# Patient Record
Sex: FEMALE | Race: WHITE | NOT HISPANIC OR LATINO | Employment: FULL TIME | URBAN - METROPOLITAN AREA
[De-identification: names, ages, dates, MRNs, and addresses within clinical notes are randomized per-mention and may not be internally consistent; named-entity substitution may affect disease eponyms.]

---

## 2018-03-22 ENCOUNTER — OFFICE VISIT (OUTPATIENT)
Dept: URGENT CARE | Facility: PHYSICIAN GROUP | Age: 23
End: 2018-03-22
Payer: COMMERCIAL

## 2018-03-22 VITALS
OXYGEN SATURATION: 97 % | TEMPERATURE: 99.4 F | DIASTOLIC BLOOD PRESSURE: 74 MMHG | HEART RATE: 106 BPM | SYSTOLIC BLOOD PRESSURE: 102 MMHG | RESPIRATION RATE: 16 BRPM

## 2018-03-22 DIAGNOSIS — J11.1 INFLUENZA: Primary | ICD-10-CM

## 2018-03-22 DIAGNOSIS — R50.9 FEVER, UNSPECIFIED FEVER CAUSE: ICD-10-CM

## 2018-03-22 PROCEDURE — 99203 OFFICE O/P NEW LOW 30 MIN: CPT | Performed by: PHYSICIAN ASSISTANT

## 2018-03-22 RX ORDER — OSELTAMIVIR PHOSPHATE 75 MG/1
75 CAPSULE ORAL 2 TIMES DAILY
Qty: 10 CAP | Refills: 0 | Status: SHIPPED | OUTPATIENT
Start: 2018-03-22 | End: 2018-03-29

## 2018-03-22 NOTE — LETTER
March 22, 2018         Patient: Jan Barrios   YOB: 1995   Date of Visit: 3/22/2018           To Whom it May Concern:    Jan Barrios was seen in my clinic on 3/22/2018. She has been diagnosed with Influenza.  She may return to work on 03/26/2018 or sooner if condition improves sooner.       If you have any questions or concerns, please don't hesitate to call.        Sincerely,           Nisha Worrell P.A.-C.  Electronically Signed

## 2018-03-29 ENCOUNTER — OFFICE VISIT (OUTPATIENT)
Dept: URGENT CARE | Facility: PHYSICIAN GROUP | Age: 23
End: 2018-03-29
Payer: COMMERCIAL

## 2018-03-29 ENCOUNTER — HOSPITAL ENCOUNTER (OUTPATIENT)
Facility: MEDICAL CENTER | Age: 23
End: 2018-03-29
Attending: PHYSICIAN ASSISTANT
Payer: COMMERCIAL

## 2018-03-29 VITALS
DIASTOLIC BLOOD PRESSURE: 70 MMHG | HEART RATE: 66 BPM | WEIGHT: 140 LBS | SYSTOLIC BLOOD PRESSURE: 100 MMHG | BODY MASS INDEX: 21.97 KG/M2 | HEIGHT: 67 IN | TEMPERATURE: 98 F | OXYGEN SATURATION: 97 % | RESPIRATION RATE: 14 BRPM

## 2018-03-29 DIAGNOSIS — N30.01 ACUTE CYSTITIS WITH HEMATURIA: ICD-10-CM

## 2018-03-29 LAB
APPEARANCE UR: NORMAL
BILIRUB UR STRIP-MCNC: NORMAL MG/DL
COLOR UR AUTO: NORMAL
GLUCOSE UR STRIP.AUTO-MCNC: NORMAL MG/DL
INT CON NEG: NEGATIVE
INT CON POS: POSITIVE
KETONES UR STRIP.AUTO-MCNC: NORMAL MG/DL
LEUKOCYTE ESTERASE UR QL STRIP.AUTO: NORMAL
NITRITE UR QL STRIP.AUTO: NORMAL
PH UR STRIP.AUTO: 7 [PH] (ref 5–8)
POC URINE PREGNANCY TEST: NORMAL
PROT UR QL STRIP: NORMAL MG/DL
RBC UR QL AUTO: NORMAL
SP GR UR STRIP.AUTO: 1.02
UROBILINOGEN UR STRIP-MCNC: NORMAL MG/DL

## 2018-03-29 PROCEDURE — 87186 SC STD MICRODIL/AGAR DIL: CPT

## 2018-03-29 PROCEDURE — 99214 OFFICE O/P EST MOD 30 MIN: CPT | Performed by: PHYSICIAN ASSISTANT

## 2018-03-29 PROCEDURE — 81025 URINE PREGNANCY TEST: CPT | Performed by: PHYSICIAN ASSISTANT

## 2018-03-29 PROCEDURE — 87086 URINE CULTURE/COLONY COUNT: CPT

## 2018-03-29 PROCEDURE — 81002 URINALYSIS NONAUTO W/O SCOPE: CPT | Performed by: PHYSICIAN ASSISTANT

## 2018-03-29 PROCEDURE — 87077 CULTURE AEROBIC IDENTIFY: CPT

## 2018-03-29 RX ORDER — NITROFURANTOIN 25; 75 MG/1; MG/1
100 CAPSULE ORAL EVERY 12 HOURS
Qty: 10 CAP | Refills: 0 | Status: SHIPPED | OUTPATIENT
Start: 2018-03-29 | End: 2018-04-03

## 2018-03-29 ASSESSMENT — ENCOUNTER SYMPTOMS
FEVER: 1
CHILLS: 1
FATIGUE: 1
SINUS PAIN: 1
SWOLLEN GLANDS: 0
ARTHRALGIAS: 1
COUGH: 1
HEADACHES: 1
SPUTUM PRODUCTION: 1
SHORTNESS OF BREATH: 0
SORE THROAT: 1
WHEEZING: 0
MYALGIAS: 1

## 2018-03-29 NOTE — PROGRESS NOTES
Chief Complaint   Patient presents with   • UTI     poss uti x2-3 days, frequency, burning       HISTORY OF PRESENT ILLNESS: Patient is a 22 y.o. female who presents today for about 2-3 days of suspected UTI.  She has had burning with urination, urgency and dysuria.   Patient notes that has been light pink tinge with wiping at times as well and suspected blood in the urine at this point.   She denies vaginal discharge and states no concern at this time for STD.   She denies nausea, vomiting, flank pain, fevers or chills.  No attempted interventions OTC.     There are no active problems to display for this patient.      Allergies:Amoxicillin    Current Outpatient Prescriptions Ordered in [facility]   Medication Sig Dispense Refill   • nitrofurantoin monohydr macro (MACROBID) 100 MG Cap Take 1 Cap by mouth every 12 hours for 5 days. 10 Cap 0   • SPRINTEC 28 0.25-35 MG-MCG per tablet      • NON SPECIFIED bcp     • methylphenidate (RITALIN) 5 MG TABS Take 5 mg by mouth 2 times a day.     • Non Formulary Request Plan b pill     • ondansetron (ZOFRAN) 4 MG TABS Take 1 Tab by mouth every 8 hours as needed for Nausea/Vomiting. 15 Each 0     No current [facility]-ordered facility-administered medications on file.        No past medical history on file.    Social History   Substance Use Topics   • Smoking status: Never Smoker   • Smokeless tobacco: Never Used   • Alcohol use Not on file       No family status information on file.   No family history on file.    ROS:  Review of Systems   Constitutional: Negative for fever, chills, weight loss and malaise/fatigue.   HENT: Negative for ear pain, nosebleeds, congestion, sore throat and neck pain.    Eyes: Negative for blurred vision.   Respiratory: Negative for cough, sputum production, shortness of breath and wheezing.    Cardiovascular: Negative for chest pain, palpitations, orthopnea and leg swelling.   Gastrointestinal: Negative for heartburn, nausea, vomiting and abdominal pain.  "  Genitourinary: SEE HPI    Exam:  Blood pressure 100/70, pulse 66, temperature 36.7 °C (98 °F), resp. rate 14, height 1.689 m (5' 6.5\"), weight 63.5 kg (140 lb), last menstrual period 03/15/2018, SpO2 97 %.  General:  Well nourished, well developed female in NAD  Eyes: PERRLA, EOM within normal limits, no conjunctival injection, no scleral icterus, visual fields and acuity grossly intact.   Mouth: reasonable hygiene, no erythema exudates or tonsillar enlargement.  Neck: no masses, range of motion within normal limits, no tracheal deviation. No lymphadenopathy  Pulmonary: Normal respiratory effort, no wheezes, crackles, or rhonchi.  Cardiovascular: regular rate and rhythm without murmurs, rubs, or gallops.  Abdomen: Soft, nontender, nondistended. Normal bowel sounds. No hepatosplenomegaly or masses, or hernias. No rebound or guarding.  No CVA tenderness.   Skin: No visible rashes or lesion. Warm, pink, dry.   Extremities: no clubbing, cyanosis, or edema.  Neuro: A&O x 3. Speech normal/clear.  Normal gait.       Component Results     Component Value Ref Range & Units Status   POC Color orange  Negative Final   POC Appearance cloudy  Negative Final   POC Leukocyte Esterase lg  Negative Final   POC Nitrites pink  Negative Final   POC Urobiligen neg  Negative (0.2) mg/dL Final   POC Protein neg  Negative mg/dL Final   POC Urine PH 7.0  5.0 - 8.0 Final   POC Blood mod  Negative Final   POC Specific Gravity 1.020  <1.005 - >1.030 Final   POC Ketones neg  Negative mg/dL Final   POC Bilirubin neg  Negative mg/dL Final   POC Glucose neg  Negative mg/dL Final       Assessment/Plan:  1. Acute cystitis with hematuria  POCT Urinalysis    POCT PREGNANCY    nitrofurantoin monohydr macro (MACROBID) 100 MG Cap    URINE CULTURE(NEW)       -POCT U/A as above.  Culture sent  -Fluids emphasized.  Void before/after intercourse.  Recommend abstain until treatment complete/symptoms resolved.   -signs and symptoms of worsening/ascending " infection discussed and to seek prompt medical care should they arise.       Supportive care, differential diagnoses, and indications for immediate follow-up discussed with patient.   Pathogenesis of diagnosis discussed including typical length and natural progression.   Instructed to return to clinic or nearest emergency department for any change in condition, further concerns, or worsening of symptoms.  Patient states understanding of the plan of care and discharge instructions.        Rosalia Christopher P.A.-C.

## 2018-03-30 NOTE — PROGRESS NOTES
Subjective:      Jan Barrios is a 22 y.o. female who presents with Sinus Problem (sore throat, cough, fever x 3 days )    Pt PMH, SocHx, SurgHx, FamHx, Drug allergies and medications reviewed with pt/EPIC.      Family history reviewed, it is not pertinent to this complaint.           Influenza   This is a new problem. The current episode started in the past 7 days. The problem occurs constantly. The problem has been rapidly worsening. Associated symptoms include arthralgias, chills, congestion, coughing, fatigue, a fever, headaches, myalgias and a sore throat. Pertinent negatives include no rash or swollen glands. The symptoms are aggravated by coughing and exertion. She has tried NSAIDs, drinking and acetaminophen for the symptoms. The treatment provided no relief.       Review of Systems   Constitutional: Positive for chills, fatigue, fever and malaise/fatigue.   HENT: Positive for congestion, sinus pain and sore throat.    Respiratory: Positive for cough and sputum production. Negative for shortness of breath and wheezing.    Musculoskeletal: Positive for arthralgias and myalgias.   Skin: Negative for rash.   Neurological: Positive for headaches.   All other systems reviewed and are negative.         Objective:     /74   Pulse (!) 106   Temp 37.4 °C (99.4 °F)   Resp 16   LMP 03/15/2018   SpO2 97%      Physical Exam   Constitutional: She is oriented to person, place, and time. She appears well-developed and well-nourished. No distress.   HENT:   Head: Normocephalic and atraumatic.   Right Ear: Tympanic membrane normal.   Left Ear: Tympanic membrane normal.   Nose: Rhinorrhea present.   Mouth/Throat: Uvula is midline and mucous membranes are normal. Posterior oropharyngeal erythema present.   Eyes: Conjunctivae and EOM are normal. Pupils are equal, round, and reactive to light.   Neck: Normal range of motion. Neck supple.   Cardiovascular: Normal rate, regular rhythm and normal heart sounds.     Pulmonary/Chest: Effort normal. No respiratory distress. She has no wheezes. She has rhonchi. She has no rales.   Abdominal: Soft.   Musculoskeletal: Normal range of motion.   Neurological: She is alert and oriented to person, place, and time.   Skin: Skin is warm and dry. Capillary refill takes less than 2 seconds.   Psychiatric: She has a normal mood and affect.   Nursing note and vitals reviewed.       Assessment/Plan:     1. Influenza     2. Fever, unspecified fever cause       Rx for tamiflu given to patient to fill if she would like to take it.     PT can continue OTC medications, increase fluids and rest until symptoms improve.     PT should follow up with PCP in 1-2 days for re-evaluation if symptoms have not improved.  Discussed red flags and reasons to return to UC or ED.  Pt and/or family verbalized understanding of diagnosis and follow up instructions and was offered informational handout on diagnosis.  PT discharged.

## 2018-04-01 LAB
BACTERIA UR CULT: ABNORMAL
BACTERIA UR CULT: ABNORMAL
SIGNIFICANT IND 70042: ABNORMAL
SITE SITE: ABNORMAL
SOURCE SOURCE: ABNORMAL

## 2019-02-04 ENCOUNTER — HOSPITAL ENCOUNTER (EMERGENCY)
Facility: MEDICAL CENTER | Age: 24
End: 2019-02-04
Attending: EMERGENCY MEDICINE
Payer: COMMERCIAL

## 2019-02-04 ENCOUNTER — NON-PROVIDER VISIT (OUTPATIENT)
Dept: OCCUPATIONAL MEDICINE | Facility: CLINIC | Age: 24
End: 2019-02-04
Payer: COMMERCIAL

## 2019-02-04 ENCOUNTER — APPOINTMENT (OUTPATIENT)
Dept: RADIOLOGY | Facility: MEDICAL CENTER | Age: 24
End: 2019-02-04
Attending: EMERGENCY MEDICINE
Payer: COMMERCIAL

## 2019-02-04 VITALS
HEIGHT: 67 IN | SYSTOLIC BLOOD PRESSURE: 131 MMHG | BODY MASS INDEX: 21.97 KG/M2 | HEART RATE: 84 BPM | TEMPERATURE: 97.1 F | OXYGEN SATURATION: 98 % | DIASTOLIC BLOOD PRESSURE: 88 MMHG | RESPIRATION RATE: 16 BRPM | WEIGHT: 140 LBS

## 2019-02-04 DIAGNOSIS — Z02.83 ENCOUNTER FOR DRUG SCREENING: ICD-10-CM

## 2019-02-04 DIAGNOSIS — Z02.1 PRE-EMPLOYMENT DRUG SCREENING: ICD-10-CM

## 2019-02-04 DIAGNOSIS — S60.221A CONTUSION OF RIGHT HAND, INITIAL ENCOUNTER: ICD-10-CM

## 2019-02-04 PROCEDURE — 73130 X-RAY EXAM OF HAND: CPT | Mod: RT

## 2019-02-04 PROCEDURE — 99284 EMERGENCY DEPT VISIT MOD MDM: CPT

## 2019-02-04 PROCEDURE — 80305 DRUG TEST PRSMV DIR OPT OBS: CPT | Performed by: INTERNAL MEDICINE

## 2019-02-04 PROCEDURE — 99026 IN-HOSPITAL ON CALL SERVICE: CPT | Performed by: INTERNAL MEDICINE

## 2019-02-04 PROCEDURE — 82075 ASSAY OF BREATH ETHANOL: CPT | Performed by: INTERNAL MEDICINE

## 2019-02-04 ASSESSMENT — LIFESTYLE VARIABLES: DO YOU DRINK ALCOHOL: NO

## 2019-02-04 NOTE — LETTER
"  FORM C-4:  EMPLOYEE’S CLAIM FOR COMPENSATION/ REPORT OF INITIAL TREATMENT  EMPLOYEE’S CLAIM - PROVIDE ALL INFORMATION REQUESTED   First Name  Jan Last Name  Denis Birthdate             Age  1995 23 y.o. Sex  female Claim Number   Home Employee Address  [address]                                     Zip  [address] Height  1.702 m (5' 7\") Weight  63.5 kg (140 lb) [facility]     Mailing Employee Address                           [address]               Zip  [address] Telephone  [phone] (home)  Primary Language Spoken  ENGLISH   Insurer   Third Party   AIG Employee's Occupation (Job Title) When Injury or Occupational Disease Occurred  [employer]   Employer's Name  [employer] Telephone  [phone]    Employer Address  [address] Zip  [address]   Date of Injury  2/4/2019       Hour of Injury  5:00 PM Date Employer Notified  2/4/2019 Last Day of Work after Injury or Occupational Disease  2/4/2019 Supervisor to Whom Injury Reported  Andi Campa   Address or Location of Accident (if applicable)  [[address] [place] [address]]   What were you doing at the time of accident? (if applicable)  Moving a patient    How did this injury or occupational disease occur? Be specific and answer in detail. Use additional sheet if necessary)  Moving the cot outside in 4-5 inches of snow on an uneven side walk. Sidewalk is very narrow. While guiding the cot, my hand was smashed between a [address] cot and the edge of the garage   If you believe that you have an occupational disease, when did you first have knowledge of the disability and it relationship to your employment?  N/A Witnesses to the Accident  Ravindra Hsu      Nature of Injury or Occupational Disease  Workers' Compensation  Part(s) of Body Injured or Affected  Hand (R), N/A, N/A    I certify that the above is true and correct to the best of my " knowledge and that I have provided this information in order to obtain the benefits of [place]  I hereby authorize any physician, chiropractor, surgeon, practitioner, or other person, any hospital, including [facility] or [facility], any medical service organization, any insurance company, or other institution or organization to release to each other, any medical or other information, including benefits paid or payable, pertinent to this injury or disease, except information relative to diagnosis, treatment and/or counseling for AIDS, psychological conditions, alcohol or controlled substances, for which I must give specific authorization.  A Photostat of this authorization shall be as valid as the original.   Date  2/4/2019 [facility] Employee’s Signature   THIS REPORT MUST BE COMPLETED AND MAILED WITHIN 3 WORKING DAYS OF TREATMENT   Place  [facility], EMERGENCY DEPT  Name of Facility   [facility]   Date  2/4/2019 Diagnosis  (S60.221A) Contusion of right hand, initial encounter Is there evidence the injured employee was under the influence of alcohol and/or another controlled substance at the time of accident?   Hour  7:51 PM Description of Injury or Disease  Contusion of right hand, initial encounter No   Treatment  Physician evaluation and treatment of right hand contusion  Have you advised the patient to remain off work five days or more?         No   X-Ray Findings  Negative   If Yes   From Date    To Date      From information given by the employee, together with medical evidence, can you directly connect this injury or occupational disease as job incurred?  Yes If No, is the employee capable of: Full Duty  No Modified Duty  Yes   Is additional medical care by a physician indicated?  No If Modified Duty, Specify any  "Limitations / Restrictions  No use of right hand for 2 days until symptoms resolved     Do you know of any previous injury or disease contributing to this condition or occupational disease?  No   Date  2/4/2019 Print Doctor’s Name  Martin Delgado I certify the employer’s copy of this form was mailed on:   Address  [address] [place] [id number] [phone] Insurer’s Use Only   [facility]  [address]    Provider’s Tax ID Number   [id number] Telephone  Dept: [phone]    Doctor’s Signature  e-MARTIN Corona M.D. Degree   MD    Original - TREATING PHYSICIAN OR CHIROPRACTOR   Pg 2-Insurer/TPA   Pg 3-Employer   Pg 4-Employee                                                                                                  Form C-4 (rev01/03)     BRIEF DESCRIPTION OF RIGHTS AND BENEFITS  (Pursuant to [place] 616C.[place])  Notice of Injury or Occupational Disease (Incident Report Form C-1): If an injury or occupational disease (OD) arises out of and in the course of employment, you must provide written notice to your employer as soon as practicable, but no later than 7 days after the accident or OD. Your employer shall maintain a sufficient supply of the required forms.  Claim for Compensation (Form C-4): If medical treatment is sought, the form C-4 is available at the place of initial treatment. A completed \"Claim for Compensation\" (Form C-4) must be filed within 90 days after an accident or OD. The treating physician or chiropractor must, within 3 working days after treatment, complete and mail to the employer, the employer's insurer and third-party , the Claim for Compensation.  Medical Treatment: If you require medical treatment for your on-the-job injury or OD, you may be required to select a physician or chiropractor from a list provided by your workers’ compensation insurer, if it has contracted with an Organization for Managed Care (MCO) or Preferred Provider Organization " (PPO) or providers of health care. If your employer has not entered into a contract with an MCO or PPO, you may select a physician or chiropractor from the Panel of Physicians and Chiropractors. Any medical costs related to your industrial injury or OD will be paid by your insurer.  Temporary Total Disability (TTD): If your doctor has certified that you are unable to work for a period of at least 5 consecutive days, or 5 cumulative days in a 20-day period, or places restrictions on you that your employer does not accommodate, you may be entitled to TTD compensation.  Temporary Partial Disability (TPD): If the wage you receive upon reemployment is less than the compensation for TTD to which you are entitled, the insurer may be required to pay you TPD compensation to make up the difference. TPD can only be paid for a maximum of 24 months.  Permanent Partial Disability (PPD): When your medical condition is stable and there is an indication of a PPD as a result of your injury or OD, within 30 days, your insurer must arrange for an evaluation by a rating physician or chiropractor to determine the degree of your PPD. The amount of your PPD award depends on the date of injury, the results of the PPD evaluation and your age and wage.  Permanent Total Disability (PTD): If you are medically certified by a treating physician or chiropractor as permanently and totally disabled and have been granted a PTD status by your insurer, you are entitled to receive monthly benefits not to exceed 66 2/3% of your average monthly wage. The amount of your PTD payments is subject to reduction if you previously received a PPD award.  Vocational Rehabilitation Services: You may be eligible for vocational rehabilitation services if you are unable to return to the job due to a permanent physical impairment or permanent restrictions as a result of your injury or occupational disease.  Transportation and Per Li Reimbursement: You may be eligible  for travel expenses and per nichole associated with medical treatment.  Reopening: You may be able to reopen your claim if your condition worsens after claim closure.  Appeal Process: If you disagree with a written determination issued by the insurer or the insurer does not respond to your request, you may appeal to the Department of Administration, , by following the instructions contained in your determination letter. You must appeal the determination within 70 days from the date of the determination letter at [address], [address], [place], [place] [address], or [address], [address], [place], [place] [address]. If you disagree with the  decision, you may appeal to the Department of Administration, . You must file your appeal within 30 days from the date of the  decision letter at [address], [address], [place], [place] [address], or [address], [address], [place], [place] [address]. If you disagree with a decision of an , you may file a petition for judicial review with the District Court. You must do so within 30 days of the Appeal Officer’s decision. You may be represented by an  at your own expense or you may contact the [facility] for possible representation.  [place]  for Injured Workers (NAIW): If you disagree with a  decision, you may request that NAIW represent you without charge at an  Hearing. For information regarding denial of benefits, you may contact the [facility] at: [address], [address] [address], [phone], or [address], [address], [place] [address], [phone]  To File a Complaint with the Division: If you wish to file a complaint with the  of the Division of Industrial Relations (DIR), please contact the Workers’ Compensation Section, [address], [address], [place], [place] [address],  telephone [phone], or [address], [address], [place], [place] [address], telephone [phone].  For assistance with Workers’ Compensation Issues: you may contact the Office of the Governor Consumer Health Assistance, [address] [place] [address], [address], [place], [place] [address], Toll Free 1-267.449.4974, Web site: http://[facility].nv., E-mail [email or web address].[email or web address].                                                                                                                                                                               __________________________________________________________________                                    _________________            Employee Name / Signature                                                                                                                            Date                                       D-2 (rev. 10/07)

## 2019-02-05 LAB
AMP AMPHETAMINE: NORMAL
BREATH ALCOHOL COMMENT: NORMAL
COC COCAINE: NORMAL
INT CON NEG: NORMAL
INT CON POS: NORMAL
MET METHAMPHETAMINES: NORMAL
OPI OPIATES: NORMAL
PCP PHENCYCLIDINE: NORMAL
POC BREATHALIZER: 0 PERCENT (ref 0–0.01)
POC DRUG COMMENT 753798-OCCUPATIONAL HEALTH: NEGATIVE
THC: NORMAL

## 2019-02-05 NOTE — ED PROVIDER NOTES
"ED Provider Note    CHIEF COMPLAINT  Chief Complaint   Patient presents with   • Hand Pain     right hand       HPI  Jan Barrios is a 23 y.o. female who presents for evaluation of acute injury to the right hand.  The patient was at work as a paramedic.  She got her right hand crushed between a stretcher and another hard object.  Injury occurred an hour ago.  She experienced significant pain and swelling.  No pain in the wrist no other complaints no soft tissue injury or laceration or abrasion.  Patient is otherwise healthy no other complaints no numbness weakness or tingling    REVIEW OF SYSTEMS  See HPI for further details.  No numbness weakness tingling fevers chills all other systems are negative.     PAST MEDICAL HISTORY  No past medical history on file.  No significant medical history  FAMILY HISTORY  Noncontributory    SOCIAL HISTORY  Social History     Social History   • Marital status: Single     Spouse name: N/A   • Number of children: N/A   • Years of education: N/A     Social History Main Topics   • Smoking status: Never Smoker   • Smokeless tobacco: Never Used   • Alcohol use Not on file   • Drug use: Unknown   • Sexual activity: Not on file     Other Topics Concern   • Not on file     Social History Narrative   • No narrative on file       SURGICAL HISTORY  Past Surgical History:   Procedure Laterality Date   • CHOLECYSTECTOMY         CURRENT MEDICATIONS  Home Medications    **Home medications have not yet been reviewed for this encounter**     None    ALLERGIES  Allergies   Allergen Reactions   • Amoxicillin        PHYSICAL EXAM  VITAL SIGNS: /96   Pulse 87   Temp 35.9 °C (96.7 °F) (Temporal)   Resp 18   Ht 1.702 m (5' 7\")   Wt 63.5 kg (140 lb)   LMP 01/28/2019 (Approximate)   SpO2 98%   Breastfeeding? No   BMI 21.93 kg/m²       Constitutional: Well developed, Well nourished, No acute distress, Non-toxic appearance.   HENT: Normocephalic, Atraumatic, Bilateral external ears normal, " Oropharynx moist, No oral exudates, Nose normal.   Eyes: PERRLA, EOMI, Conjunctiva normal, No discharge.   Neck: Normal range of motion, No tenderness, Supple, No stridor.   Cardiovascular: Normal heart rate, Normal rhythm, No murmurs, No rubs, No gallops.   Thorax & Lungs: Normal breath sounds, No respiratory distress, No wheezing, No chest tenderness.   Abdomen: Bowel sounds normal, Soft, No tenderness, No masses, No pulsatile masses.   Extremities: Soft tissue swelling noted to the dorsal aspect of the right hand overlying the metacarpal heads of the third fourth and fifth digits neurovascular exam is normal flexion and extension is normal neurologic: Alert & oriented x 3, Normal motor function, Normal sensory function, No focal deficits noted.   Psychiatric: Anxious         RADIOLOGY/PROCEDURES  DX-HAND 3+ RIGHT   Final Result      No acute fracture identified. Pain persists, recommend repeat imaging in 7 days.          COURSE & MEDICAL DECISION MAKING  Pertinent Labs & Imaging studies reviewed. (See chart for details)  Patient has no obvious fracture.  There is no suggestion of neurovascular compromise.  I recommended rest ice and NSAIDs.    FINAL IMPRESSION  1.  Right hand contusion    Electronically signed by: Martin Delgado, 2/4/2019 6:52 PM

## 2019-02-05 NOTE — ED TRIAGE NOTES
Pt is a walk in from EMS when she caught her hand between a gurney and a garage door. Pt complains of pain in her right hand and tingling. CMS in tact.

## 2019-02-07 ENCOUNTER — OCCUPATIONAL MEDICINE (OUTPATIENT)
Dept: URGENT CARE | Facility: CLINIC | Age: 24
End: 2019-02-07
Payer: COMMERCIAL

## 2019-02-07 VITALS
HEIGHT: 67 IN | WEIGHT: 140 LBS | DIASTOLIC BLOOD PRESSURE: 82 MMHG | TEMPERATURE: 97.4 F | BODY MASS INDEX: 21.97 KG/M2 | OXYGEN SATURATION: 97 % | HEART RATE: 54 BPM | RESPIRATION RATE: 16 BRPM | SYSTOLIC BLOOD PRESSURE: 124 MMHG

## 2019-02-07 DIAGNOSIS — S67.21XA CRUSHING INJURY OF RIGHT HAND, INITIAL ENCOUNTER: ICD-10-CM

## 2019-02-07 PROCEDURE — 99214 OFFICE O/P EST MOD 30 MIN: CPT | Mod: 29 | Performed by: PHYSICIAN ASSISTANT

## 2019-02-07 NOTE — LETTER
[facility]  [address] [facility] [place] [address]  Phone:  [phone] - Fax:  [phone]   [facility] Progress Report and Disability Certification  Date of Service: 2/7/2019   No Show:  No  Date / Time of Next Visit:     Claim Information   Patient Name: Jan Barrios  Claim Number:     Employer: [employer]  Date of Injury: 2/4/2019     Insurer / TPA: [employer] ID / SSN:     Occupation: [employer]  Diagnosis: The encounter diagnosis was Crushing injury of right hand, initial encounter.    Medical Information   Related to Industrial Injury? Yes    Subjective Complaints:  DOI: 2/7/19. Right hand crush injury. Seen in ER, xray NEG. Much improved today.  No further pain.  Range of motion normal.  Strength normal.  Some localized bruising.  Not taking anything.  Has not been back to work yet, for scheduled shift tomorrow.  Believe she can complete full duty.  No previous injury.  No second job.   Objective Findings: No areas of obvious tenderness.  Some bruising on the dorsal lateral right hand.  Strength normal.  No deformity seen.  Range of motion in all surrounding joints within normal limits.  Exam essentially normal   Pre-Existing Condition(s):     Assessment:   Initial Visit    Status: Discharged /  MMI  Permanent Disability:No    Plan:      Diagnostics:      Comments:       Disability Information   Status: Released to Full Duty    From:     Through:   Restrictions are:     Physical Restrictions   Sitting:    Standing:    Stooping:    Bending:      Squatting:    Walking:    Climbing:    Pushing:      Pulling:    Other:    Reaching Above Shoulder (L):   Reaching Above Shoulder (R):       Reaching Below Shoulder (L):    Reaching Below Shoulder (R):      Not to exceed Weight Limits   Carrying(hrs):   Weight Limit(lb):   Lifting(hrs):   Weight  Limit(lb):     Comments:      Repetitive Actions   Hands: i.e. Fine Manipulations from Grasping:     Feet: i.e. Operating Foot  Controls:     Driving / Operate Machinery:     Physician Name: Leena Romo P.A.-C. Physician Signature: LENEA Meza P.A.-C. e-Signature: Dr. Julio Escobedo, Medical Director   Clinic Name / Location: [address] [address] Clinic Phone Number: Dept: [phone]   Appointment Time: 5:15 Pm Visit Start Time: 5:43 PM   Check-In Time:  5:09 Pm Visit Discharge Time: 6:00 PM   Original-Treating Physician or Chiropractor    Page 2-Insurer/TPA    Page 3-Employer    Page 4-Employee

## 2019-02-08 NOTE — PROGRESS NOTES
"Subjective:      Jan Barrios is a 23 y.o. female who presents with Hand Injury ( follow up for Rt. hand )      DOI: 2/7/19. Right hand crush injury. Seen in ER, xray NEG. Much improved today.  No further pain.  Range of motion normal.  Strength normal.  Some localized bruising.  Not taking anything.  Has not been back to work yet, for scheduled shift tomorrow.  Believe she can complete full duty.  No previous injury.  No second job.     Hand Injury         ROS    Medications, Allergies, and current problem list reviewed today in Epic     Objective:     /82 (BP Location: Left arm, Patient Position: Sitting, BP Cuff Size: Adult)   Pulse (!) 54   Temp 36.3 °C (97.4 °F) (Temporal)   Resp 16   Ht 1.702 m (5' 7\")   Wt 63.5 kg (140 lb)   LMP 01/28/2019 (Approximate)   SpO2 97%   BMI 21.93 kg/m²      Physical Exam   Constitutional: She is oriented to person, place, and time. She appears well-developed and well-nourished. No distress.   Neurological: She is alert and oriented to person, place, and time.   Skin: Skin is warm and dry. She is not diaphoretic.   Psychiatric: She has a normal mood and affect. Her behavior is normal. Judgment and thought content normal.   Nursing note and vitals reviewed.      No areas of obvious tenderness.  Some bruising on the dorsal lateral right hand.  Strength normal.  No deformity seen.  Range of motion in all surrounding joints within normal limits.  Exam essentially normal       Assessment/Plan:     1. Crushing injury of right hand, initial encounter       Exam unremarkable no further tenderness.  Strength normal.    Discharged MMI    Please note that this dictation was created using voice recognition software. I have made every reasonable attempt to correct obvious errors, but I expect that there are errors of grammar and possibly content that I did not discover before finalizing the note.    "

## 2019-03-28 ENCOUNTER — OFFICE VISIT (OUTPATIENT)
Dept: URGENT CARE | Facility: CLINIC | Age: 24
End: 2019-03-28
Payer: COMMERCIAL

## 2019-03-28 VITALS
RESPIRATION RATE: 16 BRPM | HEART RATE: 70 BPM | DIASTOLIC BLOOD PRESSURE: 86 MMHG | TEMPERATURE: 97.8 F | WEIGHT: 165 LBS | OXYGEN SATURATION: 98 % | BODY MASS INDEX: 25.9 KG/M2 | SYSTOLIC BLOOD PRESSURE: 126 MMHG | HEIGHT: 67 IN

## 2019-03-28 DIAGNOSIS — Z02.89 ENCOUNTER FOR PHYSICAL EXAMINATION RELATED TO EMPLOYMENT: ICD-10-CM

## 2019-03-28 PROCEDURE — 7101 PR PHYSICAL: Performed by: NURSE PRACTITIONER

## 2019-03-28 ASSESSMENT — ENCOUNTER SYMPTOMS
DIZZINESS: 0
EYE DISCHARGE: 0
PALPITATIONS: 0
ABDOMINAL PAIN: 0
HEADACHES: 0
EYE PAIN: 0
BACK PAIN: 0
BLURRED VISION: 0
SHORTNESS OF BREATH: 0
PHOTOPHOBIA: 0
SENSORY CHANGE: 0
FOCAL WEAKNESS: 0
NECK PAIN: 0
TINGLING: 0
VOMITING: 0
EYE REDNESS: 0
NAUSEA: 0
DOUBLE VISION: 0
WEAKNESS: 0
COUGH: 0
FEVER: 0
CHILLS: 0

## 2019-03-28 ASSESSMENT — LIFESTYLE VARIABLES: SUBSTANCE_ABUSE: 0

## 2019-03-29 NOTE — PROGRESS NOTES
"Subjective:      Jan Barrios is a 23 y.o. female who presents with Employment Physical (employment physical)    Denies past medical, surgical or family history that is significant to today's problem.   RX or OTC medications-- reviewed with patient today.   Allergies   Allergen Reactions   • Amoxicillin              HPI here today for physical exam for EMS.       Review of Systems   Constitutional: Negative for chills, fever and malaise/fatigue.   HENT: Negative for hearing loss and tinnitus.    Eyes: Negative for blurred vision, double vision, photophobia, pain, discharge and redness.   Respiratory: Negative for cough and shortness of breath.    Cardiovascular: Negative for chest pain, palpitations and leg swelling.   Gastrointestinal: Negative for abdominal pain, nausea and vomiting.   Musculoskeletal: Negative for back pain and neck pain.   Neurological: Negative for dizziness, tingling, sensory change, focal weakness, weakness and headaches.   Psychiatric/Behavioral: Negative for substance abuse.          Objective:     /86 (BP Location: Left arm, Patient Position: Sitting, BP Cuff Size: Adult)   Pulse 70   Temp 36.6 °C (97.8 °F) (Temporal)   Resp 16   Ht 1.702 m (5' 7\")   Wt 74.8 kg (165 lb)   SpO2 98%   BMI 25.84 kg/m²      Physical Exam   Constitutional: She is oriented to person, place, and time. She appears well-developed and well-nourished.   HENT:   Head: Normocephalic and atraumatic.   Right Ear: Hearing, tympanic membrane, external ear and ear canal normal.   Left Ear: Hearing, tympanic membrane, external ear and ear canal normal.   Nose: Nose normal.   Mouth/Throat: Oropharynx is clear and moist. No oropharyngeal exudate.   Whisper test WNL at 5 Ft    Eyes: Pupils are equal, round, and reactive to light. Conjunctivae, EOM and lids are normal.   Visual acuity 20/20 OU uncorrected   Neck: Trachea normal, normal range of motion, full passive range of motion without pain and phonation " normal. Neck supple. No spinous process tenderness present. No erythema and normal range of motion present.   Cardiovascular: Normal rate, regular rhythm, normal heart sounds and intact distal pulses.    No murmur heard.  Pulmonary/Chest: Effort normal and breath sounds normal. No respiratory distress. She has no wheezes. She has no rales. She exhibits no tenderness.   Abdominal: Soft. Normal appearance and bowel sounds are normal. She exhibits no distension and no mass. There is no hepatosplenomegaly. There is no tenderness. There is no rebound, no guarding and no CVA tenderness. No hernia.   Musculoskeletal: Normal range of motion.   Neg SLR bilaterally   Lymphadenopathy:     She has no cervical adenopathy.   Neurological: She is alert and oriented to person, place, and time. She has normal strength. She displays no atrophy and no tremor. No cranial nerve deficit or sensory deficit. She exhibits normal muscle tone. Coordination and gait normal. GCS eye subscore is 4. GCS verbal subscore is 5. GCS motor subscore is 6.   Reflex Scores:       Tricep reflexes are 2+ on the right side and 2+ on the left side.       Bicep reflexes are 2+ on the right side and 2+ on the left side.       Brachioradialis reflexes are 2+ on the right side and 2+ on the left side.       Patellar reflexes are 2+ on the right side and 2+ on the left side.  Skin: Skin is warm and dry.   Psychiatric: She has a normal mood and affect. Her speech is normal and behavior is normal. Judgment and thought content normal. Cognition and memory are normal.   Nursing note and vitals reviewed.              Assessment/Plan:       1. Encounter for physical examination related to employment  Visual Acuity Screening     No restrictions.   See medical clearance attached.

## 2025-02-23 ENCOUNTER — RESULTS FOLLOW-UP (OUTPATIENT)
Dept: URGENT CARE | Facility: PHYSICIAN GROUP | Age: 30
End: 2025-02-23

## 2025-02-23 ENCOUNTER — HOSPITAL ENCOUNTER (OUTPATIENT)
Facility: MEDICAL CENTER | Age: 30
End: 2025-02-23
Attending: PHYSICIAN ASSISTANT
Payer: OTHER GOVERNMENT

## 2025-02-23 ENCOUNTER — OFFICE VISIT (OUTPATIENT)
Dept: URGENT CARE | Facility: PHYSICIAN GROUP | Age: 30
End: 2025-02-23
Payer: OTHER GOVERNMENT

## 2025-02-23 VITALS
HEIGHT: 67 IN | TEMPERATURE: 97.3 F | OXYGEN SATURATION: 100 % | HEART RATE: 98 BPM | BODY MASS INDEX: 29.03 KG/M2 | DIASTOLIC BLOOD PRESSURE: 72 MMHG | SYSTOLIC BLOOD PRESSURE: 124 MMHG | RESPIRATION RATE: 18 BRPM | WEIGHT: 185 LBS

## 2025-02-23 DIAGNOSIS — R50.9 FEVER, UNSPECIFIED FEVER CAUSE: ICD-10-CM

## 2025-02-23 DIAGNOSIS — J02.9 SORE THROAT: Primary | ICD-10-CM

## 2025-02-23 LAB — S PYO DNA SPEC NAA+PROBE: NOT DETECTED

## 2025-02-23 PROCEDURE — 87651 STREP A DNA AMP PROBE: CPT | Performed by: PHYSICIAN ASSISTANT

## 2025-02-23 PROCEDURE — 3074F SYST BP LT 130 MM HG: CPT | Performed by: PHYSICIAN ASSISTANT

## 2025-02-23 PROCEDURE — 99203 OFFICE O/P NEW LOW 30 MIN: CPT | Performed by: PHYSICIAN ASSISTANT

## 2025-02-23 PROCEDURE — 87070 CULTURE OTHR SPECIMN AEROBIC: CPT

## 2025-02-23 PROCEDURE — 3078F DIAST BP <80 MM HG: CPT | Performed by: PHYSICIAN ASSISTANT

## 2025-02-23 RX ORDER — PREDNISONE 20 MG/1
20 TABLET ORAL 2 TIMES DAILY
Qty: 6 TABLET | Refills: 0 | Status: SHIPPED | OUTPATIENT
Start: 2025-02-23 | End: 2025-02-26

## 2025-02-24 DIAGNOSIS — R50.9 FEVER, UNSPECIFIED FEVER CAUSE: ICD-10-CM

## 2025-02-24 DIAGNOSIS — J02.9 SORE THROAT: ICD-10-CM

## 2025-02-24 NOTE — PROGRESS NOTES
"Hakeem Barrios is a 29 y.o. female who presents with Sore Throat (X 2 day. 29 weeks pregnant)    PMH:  has no past medical history of Asthma or Diabetes ([facility]).  MEDS:   Current Outpatient Medications:     SPRINTEC 28 0.25-35 MG-MCG per tablet, , Disp: , Rfl:     NON SPECIFIED, bcp (Patient not taking: Reported on 2/23/2025), Disp: , Rfl:     methylphenidate (RITALIN) 5 MG TABS, Take 5 mg by mouth 2 times a day. (Patient not taking: Reported on 2/23/2025), Disp: , Rfl:     Non Formulary Request, Plan b pill (Patient not taking: Reported on 2/23/2025), Disp: , Rfl:     ondansetron (ZOFRAN) 4 MG TABS, Take 1 Tab by mouth every 8 hours as needed for Nausea/Vomiting. (Patient not taking: Reported on 2/23/2025), Disp: 15 Each, Rfl: 0  ALLERGIES:   Allergies   Allergen Reactions    Amoxicillin      SURGHX:   Past Surgical History:   Procedure Laterality Date    CHOLECYSTECTOMY       SOCHX:  reports that she has never smoked. She has never used smokeless tobacco.  FH: Reviewed with patient, not pertinent to this visit.           Patient presents with:  Sore Throat: X 2 day. 29 weeks pregnant.              Review of Systems   Constitutional:  Negative for fever.   HENT:  Positive for sore throat.    Respiratory: Negative.     Skin:  Negative for rash.   All other systems reviewed and are negative.             Objective     /72 Comment: pt refused  Pulse 98   Temp 36.3 °C (97.3 °F)   Resp 18   Ht 1.702 m (5' 7\")   Wt 83.9 kg (185 lb)   SpO2 100%   BMI 28.98 kg/m²      Physical Exam  Vitals and nursing note reviewed.   Constitutional:       General: She is not in acute distress.     Appearance: Normal appearance. She is well-developed. She is not ill-appearing or toxic-appearing.   HENT:      Head: Normocephalic and atraumatic.      Right Ear: Tympanic membrane normal.      Left Ear: Tympanic membrane normal.      Nose: Nose normal.      Mouth/Throat:      Lips: Pink.      Mouth: Mucous membranes " are moist.      Pharynx: Uvula midline. Posterior oropharyngeal erythema present. No pharyngeal swelling, oropharyngeal exudate or uvula swelling.      Tonsils: No tonsillar exudate.   Eyes:      Extraocular Movements: Extraocular movements intact.      Conjunctiva/sclera: Conjunctivae normal.      Pupils: Pupils are equal, round, and reactive to light.   Cardiovascular:      Rate and Rhythm: Normal rate and regular rhythm.      Heart sounds: Normal heart sounds.   Pulmonary:      Effort: Pulmonary effort is normal.      Breath sounds: Normal breath sounds.   Abdominal:      Palpations: Abdomen is soft.      Comments: Gravid abdomen   Musculoskeletal:         General: Normal range of motion.      Cervical back: Normal range of motion and neck supple.   Lymphadenopathy:      Cervical: No cervical adenopathy.   Skin:     General: Skin is warm and dry.      Capillary Refill: Capillary refill takes less than 2 seconds.   Neurological:      General: No focal deficit present.      Mental Status: She is alert and oriented to person, place, and time.      Gait: Gait normal.   Psychiatric:         Mood and Affect: Mood normal.         Behavior: Behavior is cooperative.                                  Assessment & Plan  Sore throat    Orders:    POCT CEPHEID GROUP A STREP - PCR    predniSONE (DELTASONE) 20 MG Tab; Take 1 Tablet by mouth 2 times a day for 3 days.    CULTURE THROAT; Future    CULTURE THROAT; Future    Fever, unspecified fever cause    Orders:    POCT CEPHEID GROUP A STREP - PCR    predniSONE (DELTASONE) 20 MG Tab; Take 1 Tablet by mouth 2 times a day for 3 days.    CULTURE THROAT; Future    CULTURE THROAT; Future    Results for orders placed or performed in visit on 02/23/25   POCT CEPHEID GROUP A STREP - PCR    Collection Time: 02/23/25  4:54 PM   Result Value Ref Range    POC Group A Strep, PCR Not Detected Not Detected, Invalid      PT POC strep is negative, will send for culture..  Of note, pt son also  tested negative for strep today so sore throat is likley viral in nature, but will send culture.      PT advised saltwater gargles/swishes  3-4 times daily until symptoms improve.     OTC tylenol only for pain as pt is pregnant.        Differential diagnosis, supportive care, and indications for immediate follow-up discussed with patient.  Instructed to return to clinic or nearest emergency department for any change in condition, further concerns, or worsening of symptoms.    I personally reviewed prior external notes and test results pertinent to today's visit.  I have independently reviewed and interpreted all diagnostics ordered during this urgent care visit.    PT should follow up with PCP in 1-2 days for re-evaluation if symptoms have not improved.      Discussed red flags and reasons to return to UC or ED.      Pt and/or family verbalized understanding of diagnosis and follow up instructions and was offered informational handout on diagnosis.  PT discharged.     Please note that this dictation was created using voice recognition software. I have made every reasonable attempt to correct obvious errors, but I expect that there may be errors of grammar and possibly content that I did not discover before finalizing the note.

## 2025-02-26 LAB
BACTERIA SPEC RESP CULT: NORMAL
SIGNIFICANT IND 70042: NORMAL
SITE SITE: NORMAL
SOURCE SOURCE: NORMAL

## 2025-02-28 ASSESSMENT — ENCOUNTER SYMPTOMS
FEVER: 0
RESPIRATORY NEGATIVE: 1
SORE THROAT: 1

## 2025-03-18 NOTE — Clinical Note
REFERRAL APPROVAL NOTICE         Sent on March 18, 2025                   Jan ROMAN Barrios  [address] [facility] [address]                   Dear Ms. Barrios,    After a careful review of the medical information and benefit coverage, [facility] has processed your referral. See below for additional details.    If applicable, you must be actively enrolled with your insurance for coverage of the authorized service. If you have any questions regarding your coverage, please contact your insurance directly.    REFERRAL INFORMATION   Referral #:  [id number]  Referred-To Department    Referred-By Provider:  Maternal Fetal Medicine    Bonita Gallegos M.D.   Maternal Fetal Med      [address] [facility] [address]  [phone] [address], [address]  [facility] [address] [phone]    Referral Start Date:  03/18/2025  Referral End Date:   03/18/2026             SCHEDULING  If you do not already have an appointment, please call [phone] to make an appointment.     MORE INFORMATION  If you do not already have a [employer] account, sign up at: [email or web address]  You can access your medical information, make appointments, see lab results, billing information, and more.  If you have questions regarding this referral, please contact  the [facility] Referrals department at:             [phone]. Monday - Friday 8:00AM - 5:00PM.     Sincerely,    [facility]

## 2025-03-31 ENCOUNTER — ANCILLARY PROCEDURE (OUTPATIENT)
Dept: MATERNAL FETAL MEDICINE | Facility: MEDICAL CENTER | Age: 30
End: 2025-03-31
Attending: OBSTETRICS & GYNECOLOGY
Payer: OTHER GOVERNMENT

## 2025-03-31 VITALS — DIASTOLIC BLOOD PRESSURE: 88 MMHG | BODY MASS INDEX: 29.18 KG/M2 | WEIGHT: 186.3 LBS | SYSTOLIC BLOOD PRESSURE: 130 MMHG

## 2025-03-31 DIAGNOSIS — O28.3 ABNORMAL ULTRASONIC FINDING ON ANTENATAL SCREENING OF MOTHER, ANTEPARTUM: ICD-10-CM
